# Patient Record
Sex: MALE | Race: WHITE | NOT HISPANIC OR LATINO | Employment: PART TIME | URBAN - METROPOLITAN AREA
[De-identification: names, ages, dates, MRNs, and addresses within clinical notes are randomized per-mention and may not be internally consistent; named-entity substitution may affect disease eponyms.]

---

## 2018-07-30 DIAGNOSIS — Z12.11 SCREENING FOR COLON CANCER: Primary | ICD-10-CM

## 2018-08-02 ENCOUNTER — LAB REQUISITION (OUTPATIENT)
Dept: LAB | Facility: HOSPITAL | Age: 43
End: 2018-08-02

## 2018-08-02 ENCOUNTER — OUTSIDE FACILITY SERVICE (OUTPATIENT)
Dept: GASTROENTEROLOGY | Facility: CLINIC | Age: 43
End: 2018-08-02

## 2018-08-02 DIAGNOSIS — Z12.11 ENCOUNTER FOR SCREENING FOR MALIGNANT NEOPLASM OF COLON: ICD-10-CM

## 2018-08-02 PROCEDURE — 88305 TISSUE EXAM BY PATHOLOGIST: CPT | Performed by: INTERNAL MEDICINE

## 2018-08-02 PROCEDURE — 45385 COLONOSCOPY W/LESION REMOVAL: CPT | Performed by: INTERNAL MEDICINE

## 2018-08-03 LAB
CYTO UR: NORMAL
LAB AP CASE REPORT: NORMAL
LAB AP CLINICAL INFORMATION: NORMAL
PATH REPORT.FINAL DX SPEC: NORMAL
PATH REPORT.GROSS SPEC: NORMAL

## 2018-08-06 ENCOUNTER — TELEPHONE (OUTPATIENT)
Dept: GASTROENTEROLOGY | Facility: CLINIC | Age: 43
End: 2018-08-06

## 2018-08-06 NOTE — TELEPHONE ENCOUNTER
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  This means that the polyp might have become cancerous if it was not removed. We would recommend a repeat colonoscopy in 3 years due to a combination of both the endoscopic results and what our pathologists saw under the microscope.    Thank you,    Dr. Lau

## 2018-08-06 NOTE — TELEPHONE ENCOUNTER
Pt returned call; results given and to repeat colonoscopy in 3 years per Dr. Lau. Pt voiced understanding.

## 2018-09-26 ENCOUNTER — OFFICE VISIT (OUTPATIENT)
Dept: NEUROSURGERY | Facility: CLINIC | Age: 43
End: 2018-09-26

## 2018-09-26 ENCOUNTER — HOSPITAL ENCOUNTER (OUTPATIENT)
Dept: GENERAL RADIOLOGY | Facility: HOSPITAL | Age: 43
Discharge: HOME | End: 2018-09-26
Admitting: PHYSICIAN ASSISTANT

## 2018-09-26 ENCOUNTER — HOSPITAL ENCOUNTER (OUTPATIENT)
Dept: GENERAL RADIOLOGY | Facility: HOSPITAL | Age: 43
Discharge: HOME | End: 2018-09-26

## 2018-09-26 VITALS — BODY MASS INDEX: 30.29 KG/M2 | WEIGHT: 236 LBS | HEIGHT: 74 IN | TEMPERATURE: 98 F

## 2018-09-26 DIAGNOSIS — M54.9 MECHANICAL BACK PAIN: ICD-10-CM

## 2018-09-26 DIAGNOSIS — T84.498A MECHANICAL COMPLICATION OF INTERNAL FIXATION DEVICE SUCH AS NAIL, PLATE OR ROD, INITIAL ENCOUNTER (HCC): ICD-10-CM

## 2018-09-26 DIAGNOSIS — T84.498A MECHANICAL COMPLICATION OF INTERNAL FIXATION DEVICE SUCH AS NAIL, PLATE OR ROD, INITIAL ENCOUNTER (HCC): Primary | ICD-10-CM

## 2018-09-26 PROCEDURE — 72100 X-RAY EXAM L-S SPINE 2/3 VWS: CPT

## 2018-09-26 PROCEDURE — 72120 X-RAY BEND ONLY L-S SPINE: CPT

## 2018-09-26 PROCEDURE — 99203 OFFICE O/P NEW LOW 30 MIN: CPT | Performed by: PHYSICIAN ASSISTANT

## 2018-09-26 RX ORDER — OMEPRAZOLE 20 MG/1
20 TABLET, DELAYED RELEASE ORAL
COMMUNITY

## 2018-09-26 NOTE — PROGRESS NOTES
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  In  he fell from a deer stand and sustained an L4 burst fracture.  He was treated at the [facility] where he underwent L4 laminectomy with percutaneous pedicle screw fixation from L3 to L5 for stabilization. Patient has done very well. He has mechanical low back pain from time to time.  About 6-8 weeks ago he noticed a weird noise coming from his back with bending.  He is concerned about hardware malfunction.  No inciting incident that he can think of.  He denies increased back pain. He has no pain, numbness, or weakness into his legs.         Past Medical History:   Diagnosis Date   • Hypertension        Past Surgical History:   Procedure Laterality Date   • ANKLE FUSION       at     • LUMBAR FUSION      L3-5  by  /     • PERCUTANEOUS PINNING WRIST FRACTURE      2013 at         Social History     Social History   • Marital status:      Spouse name: N/A   • Number of children: N/A   • Years of education: N/A     Occupational History   • Not on file.     Social History Main Topics   • Smoking status: Not on file   • Smokeless tobacco: Former User     Types: Snuff     Quit date:    • Alcohol use No   • Drug use: No   • Sexual activity: Defer     Other Topics Concern   • Not on file     Social History Narrative   • No narrative on file         Current Outpatient Prescriptions:   •  Cetirizine HCl (ZYRTEC ALLERGY) 10 MG capsule, 10 mg., Disp: , Rfl:   •  omeprazole OTC (PRILOSEC OTC) 20 MG EC tablet, 20 mg., Disp: , Rfl:   •  Sod Picosulfate-Mag Ox-Cit Acd 10-3.5-12 MG-GM -GM/160ML solution, Take 1 kit by mouth Take As Directed. Follow instructions that were mailed to your home. If you  didn't receive these call [phone]., Disp: 2 bottle, Rfl: 0        Review of Systems   Constitutional: Negative for activity change, appetite change, chills, diaphoresis, fatigue, fever and unexpected weight change.   HENT: Negative for congestion, dental problem, drooling, ear discharge, ear pain, facial swelling, hearing loss, mouth sores, nosebleeds, postnasal drip, rhinorrhea, sinus pressure, sneezing, sore throat, tinnitus, trouble swallowing and voice change.    Eyes: Negative for photophobia, pain, discharge, redness, itching and visual disturbance.   Respiratory: Negative for apnea, cough, choking, chest tightness, shortness of breath, wheezing and stridor.    Cardiovascular: Negative for chest pain, palpitations and leg swelling.   Gastrointestinal: Negative for abdominal distention, abdominal pain, anal bleeding, blood in stool, constipation, diarrhea, nausea, rectal pain and vomiting.   Endocrine: Negative for cold intolerance, heat intolerance, polydipsia, polyphagia and polyuria.   Genitourinary: Negative for decreased urine volume, difficulty urinating, dysuria, enuresis, flank pain, frequency, genital sores, hematuria and urgency.   Musculoskeletal: Positive for back pain. Negative for arthralgias, gait problem, joint swelling, myalgias, neck pain and neck stiffness.   Skin: Negative for color change, pallor, rash and wound.   Allergic/Immunologic: Negative for environmental allergies, food allergies and immunocompromised state.   Neurological: Negative for dizziness, tremors, seizures, syncope, facial asymmetry, speech difficulty, weakness, light-headedness, numbness and headaches.   Hematological: Negative for adenopathy. Does not bruise/bleed easily.   Psychiatric/Behavioral: Negative for agitation, behavioral problems, confusion, decreased concentration, dysphoric mood, hallucinations, self-injury, sleep disturbance and suicidal ideas. The patient is not nervous/anxious and is not  "hyperactive.    All other systems reviewed and are negative.      Objective   Vital Signs: Temperature 98 °F (36.7 °C), temperature source Temporal Artery , height 188 cm (74\"), weight 107 kg (236 lb).  Physical Exam   Constitutional: He appears well-developed and well-nourished. No distress.   HENT:   Head: Normocephalic and atraumatic.   Eyes: Pupils are equal, round, and reactive to light. EOM are normal.   Cardiovascular: Normal rate and regular rhythm.    Pulmonary/Chest: Effort normal and breath sounds normal.   Psychiatric: He has a normal mood and affect. His behavior is normal. Thought content normal.   Nursing note and vitals reviewed.  Musculoskeletal: No tenderness observed in the lumbar spine. No hardware palpable. There is an audible abnormal sound when patient bends forward. It does not sound osteoarthritic   Strength is intact in upper and lower extremities to direct testing.  Station and gait are normal.  Neurologic:  Muscle tone is normal throughout.  Coordination is intact.  Deep tendon reflexes: 1+ and symmetrical.  Sensation is intact to light touch throughout.  Patient is oriented to person, place, and time.    Independent review of radiographic imaging: I reviewed plain films of the lumbar spine. There is no obvious hardware malfunction however the quality of the studies is quite poor and unreliable.     Assessment/Plan    Diagnosis:   1) Concern for hardware malfunction   2) History of L4 laminectomy with percutaneous pedicle screw fixation from L3 to L5 after sustaining a traumatic L4 burst fracture.     Medical Decision Making: I sent Mr. Hahn for some plain films of the lumbar spine as well as some upright flexion/extension films to better assess his hardware. He will follow up in our office to review studies and for further recommendation.               Eldon was seen today for back pain.    Diagnoses and all orders for this visit:    Mechanical complication of internal fixation device " such as nail, plate or alejandrina, initial encounter (CMS/[facility])  -     XR Spine Lumbar AP & Lateral; Future  -     XR Spine Lumbar Flex & Ext; Future    Mechanical back pain               Dayana Patel PA-C  Patient Care Team:  Minerva Nelson APRN as PCP - General (Family Medicine)  Minerva Nelson APRN as Referring Physician (Family Medicine)

## 2018-10-03 ENCOUNTER — OFFICE VISIT (OUTPATIENT)
Dept: NEUROSURGERY | Facility: CLINIC | Age: 43
End: 2018-10-03

## 2018-10-03 VITALS — WEIGHT: 238 LBS | TEMPERATURE: 98.6 F | HEIGHT: 74 IN | BODY MASS INDEX: 30.54 KG/M2

## 2018-10-03 DIAGNOSIS — Z87.81 HISTORY OF COMPRESSION FRACTURE OF SPINE: ICD-10-CM

## 2018-10-03 DIAGNOSIS — M54.9 MECHANICAL BACK PAIN: Primary | ICD-10-CM

## 2018-10-03 PROCEDURE — 99213 OFFICE O/P EST LOW 20 MIN: CPT | Performed by: NEUROLOGICAL SURGERY

## 2018-10-03 NOTE — PROGRESS NOTES
Patient: Eldon Hahn  : 1975    Primary Care Provider: Minerva Nelson APRN    Requesting Provider: As above        History    Chief Complaint: Noises coming from his low back.    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  In  he fell from a deer stand and sustained an L4 burst fracture.  He was treated at the [facility] where he underwent L4 laminectomy with percutaneous pedicle screw fixation from L3 to L5 for stabilization. Patient has done very well. He has mechanical low back pain from time to time.  About 6-8 weeks ago he noticed a weird noise coming from his back with bending.  He is concerned about hardware malfunction.  No inciting incident that he can think of.  He denies increased back pain. He has no pain, numbness, or weakness into his legs.     Review of Systems   Constitutional: Negative for activity change, appetite change, chills, diaphoresis, fatigue, fever and unexpected weight change.   HENT: Negative for congestion, dental problem, drooling, ear discharge, ear pain, facial swelling, hearing loss, mouth sores, nosebleeds, postnasal drip, rhinorrhea, sinus pressure, sneezing, sore throat, tinnitus, trouble swallowing and voice change.    Eyes: Negative for photophobia, pain, discharge, redness, itching and visual disturbance.   Respiratory: Negative for apnea, cough, choking, chest tightness, shortness of breath, wheezing and stridor.    Cardiovascular: Negative for chest pain, palpitations and leg swelling.   Gastrointestinal: Negative for abdominal distention, abdominal pain, anal bleeding, blood in stool, constipation, diarrhea, nausea, rectal pain and vomiting.   Endocrine: Negative for cold intolerance, heat intolerance, polydipsia, polyphagia and polyuria.   Genitourinary: Negative for decreased urine volume, difficulty urinating, dysuria, enuresis, flank pain,  "frequency, genital sores, hematuria and urgency.   Musculoskeletal: Positive for back pain. Negative for arthralgias, gait problem, joint swelling, myalgias, neck pain and neck stiffness.   Skin: Negative for color change, pallor, rash and wound.   Allergic/Immunologic: Negative for environmental allergies, food allergies and immunocompromised state.   Neurological: Negative for dizziness, tremors, seizures, syncope, facial asymmetry, speech difficulty, weakness, light-headedness, numbness and headaches.   Hematological: Negative for adenopathy. Does not bruise/bleed easily.   Psychiatric/Behavioral: Negative for agitation, behavioral problems, confusion, decreased concentration, dysphoric mood, hallucinations, self-injury, sleep disturbance and suicidal ideas. The patient is not nervous/anxious and is not hyperactive.    All other systems reviewed and are negative.      The patient's past medical history, past surgical history, family history, and social history have been reviewed at length in the electronic medical record.    Physical Exam:   Temp 98.6 °F (37 °C) (Temporal Artery )   Ht 188 cm (74\")   Wt 108 kg (238 lb)   BMI 30.56 kg/m²   MUSCULOSKELETAL:  Straight leg raising is negative.  Trevon's Sign is negative.  ROM in back is normal.  Tenderness in the back to palpation is not observed.  NEUROLOGICAL:  Strength is intact in the lower extremities to direct testing.  Muscle tone is normal throughout.  Station and gait are normal.  Sensation is intact to light touch testing throughout.  Deep tendon reflexes are 1+ and symmetrical.  Coordination is intact.      Medical Decision Making    Data Review:   Plain films demonstrate his old L4 burst fracture and there appears to be some fusion at L3-4 anteriorly.  The hardware construct appears to be intact and I don't see any hardware fracture or any haloing around the screws.    Diagnosis:   It's possible that his hardware could be the source of the unusual " noise that he's hearing.  However, I don't see any overt signs of hardware failure.    Treatment Options:   Currently his symptoms are interesting and perhaps bordering on annoying.  If his symptoms become more pronounced then I certainly could remove the hardware but doing so would be an uncomfortable intervention with certainly some period of recovery required.  At this point we'll sit tight.  He'll follow-up with me if this becomes more problematic.       Diagnosis Plan   1. Mechanical back pain     2. History of compression fracture of spine         Scribed for Garcia Bella MD by Paula Lock CMA on 10/03/2018 at 8:37 AM      I, Dr. Bella, personally performed the services described in the documentation, as scribed in my presence, and it is both accurate and complete.

## 2020-12-22 ENCOUNTER — IMMUNIZATION (OUTPATIENT)
Dept: VACCINE CLINIC | Facility: HOSPITAL | Age: 45
End: 2020-12-22

## 2020-12-22 PROCEDURE — 0001A: CPT | Performed by: INTERNAL MEDICINE

## 2020-12-22 PROCEDURE — 91300 HC SARSCOV02 VAC 30MCG/0.3ML IM: CPT | Performed by: INTERNAL MEDICINE

## 2021-01-12 ENCOUNTER — IMMUNIZATION (OUTPATIENT)
Dept: VACCINE CLINIC | Facility: HOSPITAL | Age: 46
End: 2021-01-12

## 2021-01-12 PROCEDURE — 0002A: CPT | Performed by: INTERNAL MEDICINE

## 2021-01-12 PROCEDURE — 0001A: CPT | Performed by: INTERNAL MEDICINE

## 2021-01-12 PROCEDURE — 91300 HC SARSCOV02 VAC 30MCG/0.3ML IM: CPT | Performed by: INTERNAL MEDICINE

## 2021-09-02 DIAGNOSIS — Z12.11 ENCOUNTER FOR SCREENING COLONOSCOPY: Primary | ICD-10-CM

## 2021-09-16 ENCOUNTER — OUTSIDE FACILITY SERVICE (OUTPATIENT)
Dept: GASTROENTEROLOGY | Facility: CLINIC | Age: 46
End: 2021-09-16

## 2021-09-16 PROCEDURE — 45378 DIAGNOSTIC COLONOSCOPY: CPT | Performed by: INTERNAL MEDICINE
